# Patient Record
Sex: FEMALE
[De-identification: names, ages, dates, MRNs, and addresses within clinical notes are randomized per-mention and may not be internally consistent; named-entity substitution may affect disease eponyms.]

---

## 2023-10-31 ENCOUNTER — APPOINTMENT (OUTPATIENT)
Dept: BARIATRICS/WEIGHT MGMT | Facility: CLINIC | Age: 63
End: 2023-10-31
Payer: COMMERCIAL

## 2023-10-31 VITALS
DIASTOLIC BLOOD PRESSURE: 80 MMHG | WEIGHT: 268 LBS | HEART RATE: 108 BPM | HEIGHT: 59 IN | SYSTOLIC BLOOD PRESSURE: 129 MMHG | OXYGEN SATURATION: 92 % | BODY MASS INDEX: 54.03 KG/M2

## 2023-10-31 DIAGNOSIS — E66.01 MORBID (SEVERE) OBESITY DUE TO EXCESS CALORIES: ICD-10-CM

## 2023-10-31 DIAGNOSIS — Z87.898 PERSONAL HISTORY OF OTHER SPECIFIED CONDITIONS: ICD-10-CM

## 2023-10-31 DIAGNOSIS — Z86.79 PERSONAL HISTORY OF OTHER DISEASES OF THE CIRCULATORY SYSTEM: ICD-10-CM

## 2023-10-31 PROBLEM — Z00.00 ENCOUNTER FOR PREVENTIVE HEALTH EXAMINATION: Status: ACTIVE | Noted: 2023-10-31

## 2023-10-31 PROCEDURE — 99205 OFFICE O/P NEW HI 60 MIN: CPT

## 2023-10-31 RX ORDER — METOPROLOL SUCCINATE 100 MG/1
100 TABLET, EXTENDED RELEASE ORAL
Refills: 0 | Status: ACTIVE | COMMUNITY

## 2023-10-31 RX ORDER — DULOXETINE HYDROCHLORIDE 60 MG/1
60 CAPSULE, DELAYED RELEASE PELLETS ORAL
Refills: 0 | Status: ACTIVE | COMMUNITY

## 2023-10-31 RX ORDER — PHENTERMINE HYDROCHLORIDE 15 MG/1
15 CAPSULE ORAL
Refills: 0 | Status: ACTIVE | COMMUNITY

## 2023-10-31 RX ORDER — TRAZODONE HYDROCHLORIDE 50 MG/1
50 TABLET ORAL
Refills: 0 | Status: ACTIVE | COMMUNITY

## 2023-10-31 RX ORDER — CHLORTHALIDONE 25 MG/1
25 TABLET ORAL
Refills: 0 | Status: ACTIVE | COMMUNITY

## 2023-11-14 RX ORDER — SEMAGLUTIDE 0.25 MG/.5ML
0.25 INJECTION, SOLUTION SUBCUTANEOUS
Qty: 1 | Refills: 1 | Status: ACTIVE | COMMUNITY
Start: 2023-11-14 | End: 1900-01-01

## 2023-12-14 ENCOUNTER — APPOINTMENT (OUTPATIENT)
Dept: GASTROENTEROLOGY | Facility: CLINIC | Age: 63
End: 2023-12-14

## 2023-12-21 ENCOUNTER — APPOINTMENT (OUTPATIENT)
Dept: BARIATRICS/WEIGHT MGMT | Facility: CLINIC | Age: 63
End: 2023-12-21

## 2024-03-13 RX ORDER — TIRZEPATIDE 2.5 MG/.5ML
2.5 INJECTION, SOLUTION SUBCUTANEOUS
Qty: 4 | Refills: 1 | Status: ACTIVE | COMMUNITY
Start: 2024-03-13 | End: 1900-01-01

## 2024-07-10 ENCOUNTER — APPOINTMENT (OUTPATIENT)
Dept: BARIATRICS/WEIGHT MGMT | Facility: CLINIC | Age: 64
End: 2024-07-10
Payer: COMMERCIAL

## 2024-07-10 VITALS — BODY MASS INDEX: 54.13 KG/M2 | WEIGHT: 268 LBS

## 2024-07-10 DIAGNOSIS — E66.01 MORBID (SEVERE) OBESITY DUE TO EXCESS CALORIES: ICD-10-CM

## 2024-07-10 PROCEDURE — G2211 COMPLEX E/M VISIT ADD ON: CPT

## 2024-07-10 PROCEDURE — 99212 OFFICE O/P EST SF 10 MIN: CPT

## 2024-08-21 ENCOUNTER — APPOINTMENT (OUTPATIENT)
Dept: BARIATRICS/WEIGHT MGMT | Facility: CLINIC | Age: 64
End: 2024-08-21
Payer: COMMERCIAL

## 2024-08-21 ENCOUNTER — NON-APPOINTMENT (OUTPATIENT)
Age: 64
End: 2024-08-21

## 2024-08-21 VITALS — WEIGHT: 258 LBS | BODY MASS INDEX: 52.01 KG/M2 | HEIGHT: 59 IN

## 2024-08-21 DIAGNOSIS — E66.01 MORBID (SEVERE) OBESITY DUE TO EXCESS CALORIES: ICD-10-CM

## 2024-08-21 PROCEDURE — G2211 COMPLEX E/M VISIT ADD ON: CPT

## 2024-08-21 PROCEDURE — 99213 OFFICE O/P EST LOW 20 MIN: CPT

## 2024-08-21 RX ORDER — ONDANSETRON 4 MG/1
4 TABLET ORAL
Qty: 12 | Refills: 0 | Status: ACTIVE | COMMUNITY
Start: 2024-08-21 | End: 1900-01-01

## 2024-08-21 NOTE — HISTORY OF PRESENT ILLNESS
[Other Location: e.g. School (Enter Location, City,State)___] : at [unfilled], at the time of the visit. [Other Location: e.g. Home (Enter Location, City,State)___] : at [unfilled] [Verbal consent obtained from patient] : the patient, [unfilled] [FreeTextEntry1] : 62 y/o F here for initial Medical weight management consultation  Medical Hx:  Arthritis of right , tachycardia, Hypertension Started struggling with weight as a child  Took phentermine 15 mg x 2 weeks, feels decrease in appetite, but felt dizzy and lightheaded after taking, denies taking medicine today  Past Wt Loss Attempts: WW, slim fast, mihir tommy, etc Highest Adult Wt: now  Goal: Lose weight so she can have hip surgery and move better  Food Recall: turkey breast sandwich on white bread, slice of pizza  Doesn't cook for herself at home, lives alone + Enjoys vegetables, turkey meat, chicken, interested in trying a prepared food meal plan Water Intake: 80 + ounces of fluid (water and diet snapple) Exercise Regimen: walks as tolerated  Lifestyle is mostly sedentary, works from home  Sleep: was struggling to fall asleep, now taking trazadone which helps  Stress Level: low  Gyn: post menopausal  Social Hx: denies smoking, alcohol intake, and illicit drug use. Works for [employer] in [place] Family Hx: Father- diabetes , Mother- Breast Cancer  Dr. Lela Weiner   7/10/24: Finally able to get wegovy 0.25 mg medicine, took 3 doses so far. Tolerating well. + Chair exercises. Feels better since starting the medicine and able to move better. Drinking a lot of water each day. Getting good sleep. Trying to focus on protein intake daily.   8/21/24: Tolerating medicine well, gets some nausea after the injection. Food recall- Protein bar, L- turkey sandwich on low nicola bread, D- turkey or chicken with vegetable or salad. Patient reports feeling improved health overall, both physically and mentally. Reports having less hip pain, potential due to weight loss. Patient also reports feeling more capable and is initiating physical activity on her own, such as walking. Patient reports changes in appetite and cravings, favoring fruits and vegetables over snacks such as popcorn.

## 2024-08-21 NOTE — ASSESSMENT
[FreeTextEntry1] : - Summary : Continue medication as is currently improving patient health and manifesting positive side effects, such as weight loss. Start patient on 1mg medication dosage. Patient to continue their newly adopted healthier dietary habits. - Plan : Increase medication dosage to 1mg. Encourage continued consumption of fruits and vegetables, and avoidance of high-calorie, low-nutrient snacks. Encourage the initiation of physical activity, such as walking. Monitor patient's response to increased dosage.

## 2024-09-24 ENCOUNTER — APPOINTMENT (OUTPATIENT)
Dept: BARIATRICS/WEIGHT MGMT | Facility: CLINIC | Age: 64
End: 2024-09-24
Payer: COMMERCIAL

## 2024-09-24 VITALS — BODY MASS INDEX: 51.71 KG/M2 | WEIGHT: 256 LBS

## 2024-09-24 DIAGNOSIS — E66.01 MORBID (SEVERE) OBESITY DUE TO EXCESS CALORIES: ICD-10-CM

## 2024-09-24 PROCEDURE — 99212 OFFICE O/P EST SF 10 MIN: CPT

## 2024-09-24 PROCEDURE — G2211 COMPLEX E/M VISIT ADD ON: CPT | Mod: NC

## 2024-09-24 NOTE — ASSESSMENT
[FreeTextEntry1] : Continue lifestyle modification  Plans on getting a pedaling machine to use while working/ sitiing to inc activity level  Increased dose of wegovy to 1.7 mg weekly  RTO in 1 month for f.u

## 2024-09-24 NOTE — HISTORY OF PRESENT ILLNESS
[Other Location: e.g. School (Enter Location, City,State)___] : at [unfilled], at the time of the visit. [Medical Office: ([facility])___] : at the medical office located in  [Verbal consent obtained from patient] : the patient, [unfilled] [FreeTextEntry1] : 64 y/o F here for initial Medical weight management consultation  Medical Hx:  Arthritis of right , tachycardia, Hypertension Started struggling with weight as a child  Took phentermine 15 mg x 2 weeks, feels decrease in appetite, but felt dizzy and lightheaded after taking, denies taking medicine today  Past Wt Loss Attempts: WW, slim fast, mihir tommy, etc Highest Adult Wt: now  Goal: Lose weight so she can have hip surgery and move better  Food Recall: turkey breast sandwich on white bread, slice of pizza  Doesn't cook for herself at home, lives alone + Enjoys vegetables, turkey meat, chicken, interested in trying a prepared food meal plan Water Intake: 80 + ounces of fluid (water and diet snapple) Exercise Regimen: walks as tolerated  Lifestyle is mostly sedentary, works from home  Sleep: was struggling to fall asleep, now taking trazadone which helps  Stress Level: low  Gyn: post menopausal  Social Hx: denies smoking, alcohol intake, and illicit drug use. Works for [employer] in [place] Family Hx: Father- diabetes , Mother- Breast Cancer  Dr. Lela Weiner   7/10/24: Finally able to get wegovy 0.25 mg medicine, took 3 doses so far. Tolerating well. + Chair exercises. Feels better since starting the medicine and able to move better. Drinking a lot of water each day. Getting good sleep. Trying to focus on protein intake daily.   8/21/24: Tolerating medicine well, gets some nausea after the injection. Food recall- Protein bar, L- turkey sandwich on low nicola bread, D- turkey or chicken with vegetable or salad. Patient reports feeling improved health overall, both physically and mentally. Reports having less hip pain, potential due to weight loss. Patient also reports feeling more capable and is initiating physical activity on her own, such as walking. Patient reports changes in appetite and cravings, favoring fruits and vegetables over snacks such as popcorn.  9/24/24: Lost 2 lbs, tolerating wegovy 1 mg weekly well, denies side effects.  Food recall- yogurt, L- half turkey, cheeseburger with lettuce and tomato. Had BMs 3 times today. Walking 30 mins 3 days per week, doing chair exercises as well. Snacking less overall. HIp pain has improved.

## 2024-10-23 ENCOUNTER — APPOINTMENT (OUTPATIENT)
Dept: BARIATRICS/WEIGHT MGMT | Facility: CLINIC | Age: 64
End: 2024-10-23

## 2024-10-23 VITALS — BODY MASS INDEX: 51.3 KG/M2 | WEIGHT: 254 LBS

## 2024-10-23 DIAGNOSIS — E66.01 MORBID (SEVERE) OBESITY DUE TO EXCESS CALORIES: ICD-10-CM

## 2024-10-23 PROCEDURE — 99213 OFFICE O/P EST LOW 20 MIN: CPT

## 2024-11-18 ENCOUNTER — NON-APPOINTMENT (OUTPATIENT)
Age: 64
End: 2024-11-18

## 2024-11-20 ENCOUNTER — APPOINTMENT (OUTPATIENT)
Dept: BARIATRICS/WEIGHT MGMT | Facility: CLINIC | Age: 64
End: 2024-11-20
Payer: COMMERCIAL

## 2024-11-20 VITALS — HEIGHT: 59 IN | WEIGHT: 254 LBS | BODY MASS INDEX: 51.2 KG/M2

## 2024-11-20 DIAGNOSIS — R73.09 OTHER ABNORMAL GLUCOSE: ICD-10-CM

## 2024-11-20 DIAGNOSIS — E66.01 MORBID (SEVERE) OBESITY DUE TO EXCESS CALORIES: ICD-10-CM

## 2024-11-20 PROCEDURE — 99212 OFFICE O/P EST SF 10 MIN: CPT

## 2024-11-20 PROCEDURE — G2211 COMPLEX E/M VISIT ADD ON: CPT | Mod: NC

## 2024-11-20 RX ORDER — TIRZEPATIDE 7.5 MG/.5ML
7.5 INJECTION, SOLUTION SUBCUTANEOUS
Qty: 4 | Refills: 1 | Status: ACTIVE | COMMUNITY
Start: 2024-11-20 | End: 1900-01-01

## 2024-12-30 ENCOUNTER — APPOINTMENT (OUTPATIENT)
Dept: BARIATRICS/WEIGHT MGMT | Facility: CLINIC | Age: 64
End: 2024-12-30
Payer: COMMERCIAL

## 2024-12-30 VITALS — HEIGHT: 59 IN | BODY MASS INDEX: 51.2 KG/M2 | WEIGHT: 254 LBS

## 2024-12-30 DIAGNOSIS — E66.01 MORBID (SEVERE) OBESITY DUE TO EXCESS CALORIES: ICD-10-CM

## 2024-12-30 DIAGNOSIS — R73.09 OTHER ABNORMAL GLUCOSE: ICD-10-CM

## 2024-12-30 PROCEDURE — 99213 OFFICE O/P EST LOW 20 MIN: CPT

## 2024-12-30 PROCEDURE — G2211 COMPLEX E/M VISIT ADD ON: CPT | Mod: NC

## 2025-01-27 ENCOUNTER — APPOINTMENT (OUTPATIENT)
Dept: BARIATRICS/WEIGHT MGMT | Facility: CLINIC | Age: 65
End: 2025-01-27
Payer: COMMERCIAL

## 2025-01-27 VITALS — WEIGHT: 252 LBS | HEIGHT: 59 IN | BODY MASS INDEX: 50.8 KG/M2

## 2025-01-27 DIAGNOSIS — E66.01 MORBID (SEVERE) OBESITY DUE TO EXCESS CALORIES: ICD-10-CM

## 2025-01-27 DIAGNOSIS — R73.09 OTHER ABNORMAL GLUCOSE: ICD-10-CM

## 2025-01-27 PROCEDURE — 99213 OFFICE O/P EST LOW 20 MIN: CPT | Mod: 95

## 2025-02-26 ENCOUNTER — APPOINTMENT (OUTPATIENT)
Dept: BARIATRICS/WEIGHT MGMT | Facility: CLINIC | Age: 65
End: 2025-02-26
Payer: COMMERCIAL

## 2025-02-26 VITALS — BODY MASS INDEX: 50.4 KG/M2 | WEIGHT: 250 LBS | HEIGHT: 59 IN

## 2025-02-26 DIAGNOSIS — R73.09 OTHER ABNORMAL GLUCOSE: ICD-10-CM

## 2025-02-26 DIAGNOSIS — E66.01 MORBID (SEVERE) OBESITY DUE TO EXCESS CALORIES: ICD-10-CM

## 2025-02-26 PROCEDURE — 99212 OFFICE O/P EST SF 10 MIN: CPT | Mod: 95

## 2025-03-26 ENCOUNTER — APPOINTMENT (OUTPATIENT)
Dept: BARIATRICS/WEIGHT MGMT | Facility: CLINIC | Age: 65
End: 2025-03-26
Payer: COMMERCIAL

## 2025-03-26 VITALS — BODY MASS INDEX: 50.4 KG/M2 | WEIGHT: 250 LBS | HEIGHT: 59 IN

## 2025-03-26 DIAGNOSIS — E66.01 MORBID (SEVERE) OBESITY DUE TO EXCESS CALORIES: ICD-10-CM

## 2025-03-26 DIAGNOSIS — R73.09 OTHER ABNORMAL GLUCOSE: ICD-10-CM

## 2025-03-26 PROCEDURE — 99213 OFFICE O/P EST LOW 20 MIN: CPT | Mod: 95

## 2025-05-12 ENCOUNTER — APPOINTMENT (OUTPATIENT)
Dept: BARIATRICS/WEIGHT MGMT | Facility: CLINIC | Age: 65
End: 2025-05-12

## 2025-05-19 ENCOUNTER — APPOINTMENT (OUTPATIENT)
Dept: BARIATRICS/WEIGHT MGMT | Facility: CLINIC | Age: 65
End: 2025-05-19
Payer: COMMERCIAL

## 2025-05-19 VITALS — WEIGHT: 243 LBS | BODY MASS INDEX: 49.08 KG/M2

## 2025-05-19 DIAGNOSIS — E66.01 MORBID (SEVERE) OBESITY DUE TO EXCESS CALORIES: ICD-10-CM

## 2025-05-19 DIAGNOSIS — R73.09 OTHER ABNORMAL GLUCOSE: ICD-10-CM

## 2025-05-19 PROCEDURE — 99213 OFFICE O/P EST LOW 20 MIN: CPT | Mod: 95

## 2025-05-19 RX ORDER — METFORMIN ER 500 MG 500 MG/1
500 TABLET ORAL DAILY
Qty: 90 | Refills: 2 | Status: ACTIVE | COMMUNITY
Start: 2025-05-19 | End: 1900-01-01

## 2025-06-16 ENCOUNTER — APPOINTMENT (OUTPATIENT)
Dept: BARIATRICS/WEIGHT MGMT | Facility: CLINIC | Age: 65
End: 2025-06-16
Payer: COMMERCIAL

## 2025-06-16 VITALS — BODY MASS INDEX: 47.98 KG/M2 | WEIGHT: 238 LBS | HEIGHT: 59 IN

## 2025-06-16 PROCEDURE — 99213 OFFICE O/P EST LOW 20 MIN: CPT | Mod: 95

## 2025-07-22 ENCOUNTER — NON-APPOINTMENT (OUTPATIENT)
Age: 65
End: 2025-07-22

## 2025-08-04 ENCOUNTER — APPOINTMENT (OUTPATIENT)
Dept: BARIATRICS/WEIGHT MGMT | Facility: CLINIC | Age: 65
End: 2025-08-04
Payer: COMMERCIAL

## 2025-08-04 VITALS — BODY MASS INDEX: 47.78 KG/M2 | WEIGHT: 237 LBS | HEIGHT: 59 IN

## 2025-08-04 DIAGNOSIS — R73.09 OTHER ABNORMAL GLUCOSE: ICD-10-CM

## 2025-08-04 DIAGNOSIS — E66.01 MORBID (SEVERE) OBESITY DUE TO EXCESS CALORIES: ICD-10-CM

## 2025-08-04 PROCEDURE — 99213 OFFICE O/P EST LOW 20 MIN: CPT | Mod: 95
